# Patient Record
Sex: FEMALE | Race: WHITE
[De-identification: names, ages, dates, MRNs, and addresses within clinical notes are randomized per-mention and may not be internally consistent; named-entity substitution may affect disease eponyms.]

---

## 2021-01-28 ENCOUNTER — HOSPITAL ENCOUNTER (OUTPATIENT)
Dept: HOSPITAL 8 - OUT | Age: 66
Discharge: HOME | End: 2021-01-28
Attending: SURGERY
Payer: MEDICARE

## 2021-01-28 VITALS — DIASTOLIC BLOOD PRESSURE: 75 MMHG | SYSTOLIC BLOOD PRESSURE: 109 MMHG

## 2021-01-28 VITALS — BODY MASS INDEX: 18.88 KG/M2 | HEIGHT: 67 IN | WEIGHT: 120.26 LBS

## 2021-01-28 DIAGNOSIS — I10: ICD-10-CM

## 2021-01-28 DIAGNOSIS — Z88.5: ICD-10-CM

## 2021-01-28 DIAGNOSIS — K42.9: ICD-10-CM

## 2021-01-28 DIAGNOSIS — Z90.49: ICD-10-CM

## 2021-01-28 DIAGNOSIS — J45.909: ICD-10-CM

## 2021-01-28 DIAGNOSIS — M19.90: ICD-10-CM

## 2021-01-28 DIAGNOSIS — K41.90: Primary | ICD-10-CM

## 2021-01-28 DIAGNOSIS — K40.90: ICD-10-CM

## 2021-01-28 DIAGNOSIS — Z98.890: ICD-10-CM

## 2021-01-28 DIAGNOSIS — Z79.899: ICD-10-CM

## 2021-01-28 DIAGNOSIS — G47.00: ICD-10-CM

## 2021-01-28 PROCEDURE — C1727 CATH, BAL TIS DIS, NON-VAS: HCPCS

## 2021-01-28 PROCEDURE — C1781 MESH (IMPLANTABLE): HCPCS

## 2021-01-28 PROCEDURE — 49650 LAP ING HERNIA REPAIR INIT: CPT

## 2021-01-28 PROCEDURE — 49659 UNLSTD LAPS PX HRNAP HRNRPHY: CPT

## 2021-01-28 PROCEDURE — 49585: CPT

## 2021-01-28 RX ADMIN — FENTANYL CITRATE PRN MCG: 50 INJECTION INTRAMUSCULAR; INTRAVENOUS at 12:25

## 2021-01-28 RX ADMIN — FENTANYL CITRATE PRN MCG: 50 INJECTION INTRAMUSCULAR; INTRAVENOUS at 12:20

## 2021-01-28 RX ADMIN — FENTANYL CITRATE PRN MCG: 50 INJECTION INTRAMUSCULAR; INTRAVENOUS at 12:50

## 2021-01-28 RX ADMIN — FENTANYL CITRATE PRN MCG: 50 INJECTION INTRAMUSCULAR; INTRAVENOUS at 12:30

## 2021-07-12 ENCOUNTER — TELEPHONE (OUTPATIENT)
Dept: OBGYN | Facility: CLINIC | Age: 66
End: 2021-07-12

## 2021-07-12 NOTE — TELEPHONE ENCOUNTER
Fransico (Pt's spouse) called regarding release of records. Explained to Fransico that we are unable to send [facility] through email due to the privacy act (HIPAA). Informed Fransico that they can call their previous clinic/facility and they can sign a release of records authorizing them to send it to us. Fransico understood and has no further questions.

## 2021-08-17 ENCOUNTER — GYNECOLOGY VISIT (OUTPATIENT)
Dept: OBGYN | Facility: CLINIC | Age: 66
End: 2021-08-17
Payer: MEDICARE

## 2021-08-17 VITALS — DIASTOLIC BLOOD PRESSURE: 93 MMHG | SYSTOLIC BLOOD PRESSURE: 122 MMHG | WEIGHT: 116 LBS

## 2021-08-17 DIAGNOSIS — R19.00 PELVIC MASS: ICD-10-CM

## 2021-08-17 DIAGNOSIS — Z90.79 HX OF TOTAL HYSTERECTOMY WITH REMOVAL OF BOTH TUBES AND OVARIES: ICD-10-CM

## 2021-08-17 DIAGNOSIS — Z90.722 HX OF TOTAL HYSTERECTOMY WITH REMOVAL OF BOTH TUBES AND OVARIES: ICD-10-CM

## 2021-08-17 DIAGNOSIS — R10.2 PELVIC PAIN: ICD-10-CM

## 2021-08-17 DIAGNOSIS — Z90.710 HX OF TOTAL HYSTERECTOMY WITH REMOVAL OF BOTH TUBES AND OVARIES: ICD-10-CM

## 2021-08-17 PROCEDURE — 99203 OFFICE O/P NEW LOW 30 MIN: CPT | Performed by: OBSTETRICS & GYNECOLOGY

## 2021-08-17 NOTE — PROGRESS NOTES
Subjective     Francie Emery is a 66 y.o. female who presents as a New Patient            HPI patient is a 66-year-old  0 who presents today for gynecologic evaluation as a new patient.  Patient reports pelvic pain/right lower quadrant abdominal pain for about a year and states she is able to palpate the mass in the lower abdominal region close to the pelvic sidewall.  Pain has been present for a year and sometimes radiate around her right side to the back and will sometimes radiate down to her groin/right labia.  Patient states pain is aggravated if she sits in certain position or if she lies on her abdomen or applies pressure in that area but is not aggravated or walking.  Patient states she sometimes wake up in significant pain if she sleeps on her abdomen.  She states she was recently sent for an ultrasound of that area and brought report today.    Patient reports no changes in bowel or bladder habits.  Reports no changes in weight.  She states she had a colonoscopy recently and had 3 precancerous polyps removed.    Patient denies any vaginal bleeding or discharge.  Patient states she had a hysterectomy 20 years ago with removal of fallopian tubes and ovaries due to endometriosis.    She states she has a lot of hernias and she had 3 ventral hernias repaired in February of this year at [facility]    Had history of appendectomy    ROS all organ systems are reviewed and are negative except for complaints in HPI           Objective     /93 (BP Location: Right arm, Patient Position: Sitting)   Wt 52.6 kg (116 lb)      Physical Exam  Vitals and nursing note reviewed. Exam conducted with a chaperone present.   Constitutional:       General: She is not in acute distress.     Appearance: Normal appearance. She is normal weight. She is not toxic-appearing.   HENT:      Head: Normocephalic and atraumatic.   Eyes:      General: No scleral icterus.        Right eye: No discharge.         Left eye: No  discharge.      Conjunctiva/sclera: Conjunctivae normal.   Cardiovascular:      Rate and Rhythm: Normal rate and regular rhythm.      Pulses: Normal pulses.      Heart sounds: Normal heart sounds. No murmur heard.     Pulmonary:      Effort: Pulmonary effort is normal. No respiratory distress.      Breath sounds: Normal breath sounds. No wheezing.   Abdominal:      General: Abdomen is flat. Bowel sounds are normal. There is no distension.      Palpations: Abdomen is soft. There is mass.      Tenderness: There is abdominal tenderness. There is no right CVA tenderness, left CVA tenderness, guarding or rebound.      Hernia: No hernia is present.       Genitourinary:     General: Normal vulva.   Musculoskeletal:         General: No swelling. Normal range of motion.      Cervical back: Normal range of motion and neck supple. No rigidity.      Right lower leg: No edema.      Left lower leg: No edema.   Lymphadenopathy:      Cervical: No cervical adenopathy.   Skin:     General: Skin is warm and dry.      Coloration: Skin is not jaundiced.      Findings: No bruising.   Neurological:      General: No focal deficit present.      Mental Status: She is alert and oriented to person, place, and time.      Coordination: Coordination normal.      Gait: Gait normal.   Psychiatric:         Mood and Affect: Mood normal.         Behavior: Behavior normal.         Thought Content: Thought content normal.         Judgment: Judgment normal.          Ultrasound report reviewed with patient and her :  Finding was normal soft tissue.  Normal vasculature seen.  There is a 0.7 x 0.7 x 0.2 cm right inguinal lymph node.  A second 0.8 x 0.2 x 1 cm right inguinal lymph node was also identified..  This lymph node there is a 0.9 x 0.2 cm fluid collection.  No evidence of recurrent hernia.  Left side is unremarkable.  Impression: No evidence of recurrent right-sided hernia.  2 small lymph nodes identified in the inguinal right region.  Right  inguinal tiny fluid collection        Discussion:  I discussed ultrasound findings with patient and I discussed findings on exam.  There is a definite tubular masslike structure palpated in the right lower pelvic abdominal region.  I discussed that this is unlikely of GYN etiology since she had her reproductive organs removed.  I discussed that I would recommend a different imaging modality such as pelvic MRI and patient agrees to do this in order was placed today.  She states she will try to get the study done closer to her home.  I discussed that after we obtain some imaging then we can move forward with possible treatment which may be a possible referral to general surgery.  All questions and concerns were addressed to patient satisfaction.  We will follow-up after pelvic MRI         Assessment & Plan        1. Pelvic mass  66-year-old with right-sided pelvic mass and pain.  Symptoms have been present for about a year.  Ultrasound shows some pelvic lymph node and fluid collection but is otherwise unremarkable.  Masslike structure was palpable on exam today and we discussed different imaging modality and pelvic MRI was ordered.  - MR-PELVIS-WITH & W/O AND SEQUENCES; Future    2. Pelvic pain  Patient to continue pain management with heat and ibuprofen and Tylenol  - MR-PELVIS-WITH & W/O AND SEQUENCES; Future    3. Hx of total hysterectomy with removal of both tubes and ovaries  Patient has had history of hysterectomy with BSO due to endometriosis 20 years ago    4.  Precautions and plan of care were discussed.  We will follow-up/advise patient after imaging

## 2021-08-17 NOTE — NON-PROVIDER
Pt here for new pt appt  Pt states has a mass on right pelvic discomfort and pain for over a year.   Pharmacy verified  Good #:[phone] a  LMP: postmenopausal   PAP:unknown   BC: Hysterectomy   MAMMO: unknown

## 2021-08-18 ENCOUNTER — TELEPHONE (OUTPATIENT)
Dept: OBGYN | Facility: CLINIC | Age: 66
End: 2021-08-18

## 2021-08-18 NOTE — TELEPHONE ENCOUNTER
Imaging called today wanting to know the information on the provider that ordered patients MRI of the pelvis. Let them know that the ordering provider was Dr. Dc, NPI# [id number]. Phone number [phone] and fax number [phone]. Imaging had no further questions.

## 2021-08-30 ENCOUNTER — TELEPHONE (OUTPATIENT)
Dept: OBGYN | Facility: CLINIC | Age: 66
End: 2021-08-30

## 2021-08-30 NOTE — TELEPHONE ENCOUNTER
Returning Pt's VM left on 8/30/21 @ 8:27 am. Pt's message was just following-up with her imaging results from Dr. Dc. If Pt calls back, please clarify her message. Thank you.

## 2021-08-30 NOTE — TELEPHONE ENCOUNTER
Returning Pt's VM on 8/30/2021 regarding MRI results on 8/24/2021. Advised Pt that we do not have her results yet but I will verify with the provider's MA if we have it. Informed Pt that the provider is out of the office but will be here this Thursday. Advised Pt that we will forward her message to the provider. Pt states that in case we do have her results that to give her a call or leave a message. Pt has no other questions.

## 2021-09-03 ENCOUNTER — TELEPHONE (OUTPATIENT)
Dept: OBGYN | Facility: CLINIC | Age: 66
End: 2021-09-03

## 2021-09-03 ENCOUNTER — TELEPHONE (OUTPATIENT)
Dept: OBGYN | Facility: CLINIC | Age: 66
End: 2021-09-03
Payer: MEDICARE

## 2021-09-03 DIAGNOSIS — R22.2 ABDOMINAL WALL MASS: ICD-10-CM

## 2021-09-03 DIAGNOSIS — R10.31 RIGHT LOWER QUADRANT ABDOMINAL PAIN: ICD-10-CM

## 2021-09-03 NOTE — TELEPHONE ENCOUNTER
Pt called in regards to her MRI Results. Gave pt information to Dr. Vanda SHEPARD. Advised pt that the Doctor is in clinic and  needs to review the results and will call back as soon as he can. Pt verbalized understanding.

## 2021-09-03 NOTE — TELEPHONE ENCOUNTER
I called and spoke to patient regarding MRI results which were basically normal.  On examination she does have a bulge in the right lower quadrant and I discussed that this may be a hernia.  I discussed that I can refer her to general surgery for evaluation since she has had several hernias in her abdominal wall repaired before and patient agrees for referral to general surgery through [facility] and referral was placed today

## 2023-06-29 PROBLEM — S92.321K: Status: ACTIVE | Noted: 2023-06-29
